# Patient Record
Sex: FEMALE | Race: ASIAN | NOT HISPANIC OR LATINO | Employment: UNEMPLOYED | URBAN - METROPOLITAN AREA
[De-identification: names, ages, dates, MRNs, and addresses within clinical notes are randomized per-mention and may not be internally consistent; named-entity substitution may affect disease eponyms.]

---

## 2017-01-18 ENCOUNTER — ALLIED HEALTH/NURSE VISIT (OUTPATIENT)
Dept: FAMILY MEDICINE | Facility: CLINIC | Age: 47
End: 2017-01-18

## 2017-01-18 DIAGNOSIS — Z23 NEEDS FLU SHOT: Primary | ICD-10-CM

## 2017-01-18 NOTE — NURSING NOTE
Valorie Pacheco      1.  Has the patient received the information for the influenza vaccine? YES    2.  Does the patient have any of the following contraindications?     Allergy to eggs? No     Allergic reaction to previous influenza vaccines? No     Any other problems to previous influenza vaccines? No     Paralyzed by Guillain-Ettrick syndrome? No     Currently pregnant? NO     Current moderate or severe illness? No    Vaccination given by MA.  Recorded by Vitor Bazan     name: Azael Hendricks  Language: [facility]  Agency: [employer]  Phone number: [phone]      Dr Lopez is a preceptor on site during the time of the service.

## 2017-03-01 DIAGNOSIS — R52 BODY ACHES: ICD-10-CM

## 2017-03-02 ENCOUNTER — OFFICE VISIT (OUTPATIENT)
Dept: FAMILY MEDICINE | Facility: CLINIC | Age: 47
End: 2017-03-02

## 2017-03-02 VITALS
WEIGHT: 118.8 LBS | BODY MASS INDEX: 26.73 KG/M2 | DIASTOLIC BLOOD PRESSURE: 66 MMHG | TEMPERATURE: 97.8 F | OXYGEN SATURATION: 97 % | HEART RATE: 61 BPM | HEIGHT: 56 IN | SYSTOLIC BLOOD PRESSURE: 104 MMHG

## 2017-03-02 DIAGNOSIS — Z23 IMMUNIZATION DUE: ICD-10-CM

## 2017-03-02 DIAGNOSIS — R52 BODY ACHES: Primary | ICD-10-CM

## 2017-03-02 NOTE — PROGRESS NOTES
Preceptor Attestation:  Patient's case reviewed and discussed with Renata Packer MD resident. I agree with written assessment and plan of care.  Supervising Physician:  ALAINA TOM MD  [facility]

## 2017-03-02 NOTE — LETTER
[employer] Customer Service  [facility]  [address], [address]  [place] [address]  Fax: [phone]  Phone: [phone]      2017      Valorie Pacheco  [address] [place] [address] [address]  [place] [address]        Dear Valorie,    Thank you for your interest in becoming a [employer] user!    Your access code is: [email or web address]  Expires: 2017  1:22 PM     Please access the [employer] website at [email or web address].  Below the ID and password fields, select the  Sign Up Now  as New User.  You will be prompted to enter the access code listed above as well as additional personal information.  Please follow the directions carefully when creating your username and password.    If you allow your access code to , or if you have any questions please call a [employer] Representative at [phone] during normal clinic hours.     Sincerely,      [employer] Customer Service  [facility]

## 2017-03-02 NOTE — MR AVS SNAPSHOT
"              After Visit Summary   3/2/2017    Valorie Pacheco    MRN: [id number]           Patient Information     Date Of Birth          1970        Visit Information        Provider Department      3/2/2017 1:20 PM Renata Packer MD [facility]        Today's Diagnoses     Immunization due    -  1       Follow-ups after your visit        Who to contact     Please call your clinic at [phone] to:    Ask questions about your health    Make or cancel appointments    Discuss your medicines    Learn about your test results    Speak to your doctor   If you have compliments or concerns about an experience at your clinic, or if you wish to file a complaint, please contact [facility] Patient Relations at [phone] or email us at [email or web address]         Additional Information About Your Visit        MyChart Information     StraighterLine is an electronic gateway that provides easy, online access to your medical records. With StraighterLine, you can request a clinic appointment, read your test results, renew a prescription or communicate with your care team.     To sign up for [employer] visit the website at [email or web address]   You will be asked to enter the access code listed below, as well as some personal information. Please follow the directions to create your username and password.     Your access code is: [email or web address]  Expires: 2017  1:22 PM     Your access code will  in 90 days. If you need help or a new code, please contact your [facility] or call [phone] for assistance.        Care EveryWhere ID     This is your Care EveryWhere ID. This could be used by other organizations to access your [place] medical records  [id number]        Your Vitals Were     Pulse Temperature Height Pulse Oximetry BMI (Body Mass Index)       61 97.8  F (36.6  C) (Oral) 4' 8\" (142.2 cm) 97% 26.63 kg/m2        Blood Pressure from Last 3 " Encounters:   03/02/17 104/66   11/29/16 115/71   02/04/16 106/67    Weight from Last 3 Encounters:   03/02/17 118 lb 12.8 oz (53.9 kg)   11/29/16 113 lb (51.3 kg)   02/04/16 124 lb (56.2 kg)              We Performed the Following     ADMIN VACCINE, INITIAL     TDAP (BOOSTRIX AGES 10 and older)        Primary Care Provider Office Phone # Fax #    Renata Packer [phone]       [facility] [address] [place] [address]        Thank you!     Thank you for choosing [facility]  for your care. Our goal is always to provide you with excellent care. Hearing back from our patients is one way we can continue to improve our services. Please take a few minutes to complete the written survey that you may receive in the mail after your visit with us. Thank you!             Your Updated Medication List - Protect others around you: Learn how to safely use, store and throw away your medicines at www.disposemymeds.org.          This list is accurate as of: 3/2/17  1:48 PM.  Always use your most recent med list.                   Brand Name Dispense Instructions for use    piroxicam 20 MG capsule    FELDENE    30 capsule    Take 1 capsule (20 mg) by mouth daily as needed

## 2017-03-02 NOTE — PROGRESS NOTES
"       HPI:       Valorie Pacheco is a 46 year old  female with a significant past medical history of depression who presents for follow up of concern(s) listed below:    #Med refill:  -Patient here requesting refill of piroxicam- says she takes 1 pill daily, approx 1-2 times per month, which helps with her occasional back pain or wrist pain.  No musculoskeletal complaints today, just wants a refill today  -No hx of kidney disease, no hx of ulcers    BMP 11/2016:  Cr 0.9, GFR 71         PMHX:     Patient Active Problem List   Diagnosis     Body aches     Irregular menses     Major depressive disorder, recurrent episode, moderate (H)       Current Outpatient Prescriptions   Medication Sig Dispense Refill     piroxicam (FELDENE) 20 MG capsule Take 1 capsule (20 mg) by mouth daily as needed 30 capsule 3          Allergies   Allergen Reactions     No Known Allergies        No results found for this or any previous visit (from the past 24 hour(s)).         Review of Systems:   5-Point Review of Systems Negative -- Except as noted above.          Physical Exam:     Vitals:    03/02/17 1313   BP: 104/66   Pulse: 61   Temp: 97.8  F (36.6  C)   TempSrc: Oral   SpO2: 97%   Weight: 118 lb 12.8 oz (53.9 kg)   Height: 4' 8\" (142.2 cm)     Body mass index is 26.63 kg/(m^2).    Gen alert pleasant NAD  Psyc mood and affect appropriate  Neuro alert and oriented x 3, CN grossly normal, ambulating without assistance    Office Visit on 11/29/2016   Component Date Value Ref Range Status     Glucose 11/29/2016 89.0  60.0 - 109.0 mg/dL Final     Urea Nitrogen 11/29/2016 18.0  5.0 - 24.0 mg/dL Final     Creatinine 11/29/2016 0.9  0.6 - 1.3 mg/dL Final     Sodium 11/29/2016 141.0  133.0 - 144.0 mmol/L Final     Potassium 11/29/2016 4.4  3.4 - 5.3 mmol/L Final     Chloride 11/29/2016 105.0  94.0 - 109.0 mmol/L Final     Carbon Dioxide 11/29/2016 27.0  20.0 - 32.0 mmol/L Final     Calcium 11/29/2016 9.2  8.5 - 10.4 mg/dL Final     eGFR Calculated " (Non Black Referen* 11/29/2016 71.3  >60.0 mL/min Final     eGFR Calculated (Black Reference) 11/29/2016 86.3  >60.0 mL/min Final     TSH 11/29/2016 1.55  0.30 - 5.00 uIU/mL Final     Magnesium 11/29/2016 2.3  1.8 - 2.6 mg/dL Final     Hemoglobin 11/29/2016 14.3  11.7 - 15.7 g/dL Final       Assessment and Plan     (R52) Body aches  (primary encounter diagnosis)  Comment: using piroxicam intermittently, normal kidney function in 11/2016.    Plan: Refill sent to pharmacy- counseled patient on limiting use to prn, risk of ulcers and kidney damage with chronic use    (Z23) Immunization due  Comment: Tdap given today  Plan: ADMIN VACCINE, INITIAL, TDAP (BOOSTRIX AGES 10         and older)              Options for treatment and follow-up care were reviewed with the patient and/or guardian. Valorie Junior and/or guardian engaged in the decision making process and verbalized understanding of the options discussed and agreed with the final plan.      Renata Packer MD      Precepted today with: Lacie Brown MD

## 2017-11-29 ENCOUNTER — OFFICE VISIT (OUTPATIENT)
Dept: FAMILY MEDICINE | Facility: CLINIC | Age: 47
End: 2017-11-29

## 2017-11-29 VITALS — OXYGEN SATURATION: 99 % | RESPIRATION RATE: 18 BRPM

## 2017-11-29 DIAGNOSIS — K21.9 GASTROESOPHAGEAL REFLUX DISEASE, ESOPHAGITIS PRESENCE NOT SPECIFIED: ICD-10-CM

## 2017-11-29 DIAGNOSIS — R10.13 ABDOMINAL PAIN, EPIGASTRIC: ICD-10-CM

## 2017-11-29 DIAGNOSIS — E80.6 HYPERBILIRUBINEMIA: ICD-10-CM

## 2017-11-29 DIAGNOSIS — E87.1 HYPONATREMIA: Primary | ICD-10-CM

## 2017-11-29 LAB
% GRANULOCYTES: 77.2 %G (ref 40–75)
ALBUMIN SERPL-MCNC: 4.8 G/DL (ref 3.3–4.6)
ALBUMIN SERPL-MCNC: 4.8 G/DL (ref 3.3–4.6)
ALP SERPL-CCNC: 68 U/L (ref 40–150)
ALP SERPL-CCNC: 68 U/L (ref 40–150)
ALT SERPL-CCNC: 41 U/L (ref 0–45)
ALT SERPL-CCNC: 41 U/L (ref 0–45)
AST SERPL-CCNC: 49 U/L (ref 0–45)
AST SERPL-CCNC: 49 U/L (ref 0–45)
BILIRUB SERPL-MCNC: 2.6 MG/DL (ref 0.2–1.3)
BILIRUB SERPL-MCNC: 2.6 MG/DL (ref 0.2–1.3)
BUN SERPL-MCNC: 2 MG/DL (ref 5–24)
BUN SERPL-MCNC: 2 MG/DL (ref 5–24)
CALCIUM SERPL-MCNC: 8 MG/DL (ref 8.5–10.4)
CALCIUM SERPL-MCNC: 8 MG/DL (ref 8.5–10.4)
CHLORIDE SERPLBLD-SCNC: 82 MMOL/L (ref 94–109)
CHLORIDE SERPLBLD-SCNC: 82 MMOL/L (ref 94–109)
CO2 SERPL-SCNC: 22 MMOL/L (ref 20–32)
CO2 SERPL-SCNC: 22 MMOL/L (ref 20–32)
CREAT SERPL-MCNC: 0.4 MG/DL (ref 0.6–1.3)
CREAT SERPL-MCNC: 0.4 MG/DL (ref 0.6–1.3)
EGFR CALCULATED (BLACK REFERENCE): >90 ML/MIN
EGFR CALCULATED (NON BLACK REFERENCE): >90 ML/MIN
GLUCOSE SERPL-MCNC: 106 MG/DL (ref 60–109)
GLUCOSE SERPL-MCNC: 106 MG/DL (ref 60–109)
GRANULOCYTES #: 3.4 K/UL (ref 1.6–8.3)
HCT VFR BLD AUTO: 39.7 % (ref 35–47)
HEMOGLOBIN: 13.3 G/DL (ref 11.7–15.7)
LIPASE SERPL-CCNC: 12 U/L (ref 0–52)
LYMPHOCYTES # BLD AUTO: 0.8 K/UL (ref 0.8–5.3)
LYMPHOCYTES NFR BLD AUTO: 18.4 %L (ref 20–48)
MCH RBC QN AUTO: 30 PG (ref 26.5–35)
MCHC RBC AUTO-ENTMCNC: 33.5 G/DL (ref 32–36)
MCV RBC AUTO: 89.4 FL (ref 78–100)
MID #: 0.2 K/UL (ref 0–2.2)
MID %: 4.4 %M (ref 0–20)
PLATELET # BLD AUTO: 112 K/UL (ref 150–450)
POTASSIUM SERPL-SCNC: 2.9 MMOL/L (ref 3.4–5.3)
POTASSIUM SERPL-SCNC: 2.9 MMOL/L (ref 3.4–5.3)
PROT SERPL-MCNC: 7.4 G/DL (ref 6.6–8.8)
PROT SERPL-MCNC: 7.4 G/DL (ref 6.6–8.8)
RBC # BLD AUTO: 4.44 M/UL (ref 3.8–5.2)
SODIUM SERPL-SCNC: 121 MMOL/L (ref 133–144)
SODIUM SERPL-SCNC: 121 MMOL/L (ref 133–144)
WBC # BLD AUTO: 4.4 K/UL (ref 4–11)

## 2017-11-29 NOTE — MR AVS SNAPSHOT
After Visit Summary   11/29/2017    Valorie Pacheco    MRN: [id number]           Patient Information     Date Of Birth          1970        Visit Information        Provider Department      11/29/2017 9:20 AM Pipe Ku MD [facility]        Today's Diagnoses     Hyponatremia    -  1    Hyperbilirubinemia        Gastroesophageal reflux disease, esophagitis presence not specified        Abdominal pain, epigastric           Follow-ups after your visit        Who to contact     Please call your clinic at [phone] to:    Ask questions about your health    Make or cancel appointments    Discuss your medicines    Learn about your test results    Speak to your doctor   If you have compliments or concerns about an experience at your clinic, or if you wish to file a complaint, please contact [facility] Patient Relations at [phone] or email us at [email or web address]         Additional Information About Your Visit        MyChart Information     SmartDocs ([employer]eknowmics)t gives you secure access to your electronic health record. If you see a primary care provider, you can also send messages to your care team and make appointments. If you have questions, please call your primary care clinic.  If you do not have a primary care provider, please call [phone] and they will assist you.      [employer] is an electronic gateway that provides easy, online access to your medical records. With In[employer], you can request a clinic appointment, read your test results, renew a prescription or communicate with your care team.     To access your existing account, please contact your [facility] or call [phone] for assistance.        Care EveryWhere ID     This is your Care EveryWhere ID. This could be used by other organizations to access your [place] medical records  [id number]        Your Vitals Were     Respirations Pulse Oximetry                18  99%           Blood Pressure from Last 3 Encounters:   03/02/17 104/66   11/29/16 115/71   02/04/16 106/67    Weight from Last 3 Encounters:   03/02/17 118 lb 12.8 oz (53.9 kg)   11/29/16 113 lb (51.3 kg)   02/04/16 124 lb (56.2 kg)              We Performed the Following     CBC with Diff Plt ([facility])     Comprehensive Metabolic Panel ([facility]) - results <1hr Protocol     Lipase ([facility])          Today's Medication Changes          These changes are accurate as of: 11/29/17 11:59 PM.  If you have any questions, ask your nurse or doctor.               Start taking these medicines.        Dose/Directions    lidocaine (viscous) 15 mL, alum & mag hydroxide-simethicone 15 mL GI Cocktail   Used for:  Gastroesophageal reflux disease, esophagitis presence not specified, Abdominal pain, epigastric   Started by:  Pipe Ku MD        Dose:  30 mL   30 mLs by Oral or Feeding Tube route once for 1 dose   Quantity:  30 mL   Refills:  0            Where to get your medicines      Some of these will need a paper prescription and others can be bought over the counter.  Ask your nurse if you have questions.     Bring a paper prescription for each of these medications     lidocaine (viscous) 15 mL, alum & mag hydroxide-simethicone 15 mL GI Cocktail                Primary Care Provider Office Phone # Fax #    Yvette Nedra Herr [phone]       [facility] [address] [place] [address]        Equal Access to Services     KRISTA HYMAN AH: Hadii aad ku hadasho Socliffali, waaxda luqadaha, qaybta kaalmada adeegyada, waxay karen giron ah. So [facility] [phone].    ATENCIÓN: Si habla español, tiene a jesus disposición servicios gratuitos de asistencia lingüística. Llame al [phone].    We comply with applicable federal civil rights laws and [place] laws. We do not discriminate on the basis of race, color, national origin, age, disability, sex, sexual orientation, or gender  identity.            Thank you!     Thank you for choosing [facility]  for your care. Our goal is always to provide you with excellent care. Hearing back from our patients is one way we can continue to improve our services. Please take a few minutes to complete the written survey that you may receive in the mail after your visit with us. Thank you!             Your Updated Medication List - Protect others around you: Learn how to safely use, store and throw away your medicines at www.disposemymeds.org.          This list is accurate as of: 11/29/17 11:59 PM.  Always use your most recent med list.                   Brand Name Dispense Instructions for use Diagnosis    lidocaine (viscous) 15 mL, alum & mag hydroxide-simethicone 15 mL GI Cocktail     30 mL    30 mLs by Oral or Feeding Tube route once for 1 dose    Gastroesophageal reflux disease, esophagitis presence not specified, Abdominal pain, epigastric       piroxicam 20 MG capsule    FELDENE    30 capsule    Take 1 capsule (20 mg) by mouth daily as needed    Body aches

## 2017-11-29 NOTE — NURSING NOTE
PHQ9  Offer Flu shot    The following medication was given orally:     MEDICATION: Lidocaine Viscous 2%   ROUTE: PO  SITE: mouth  DOSE: 15 ml LOT #: [id number]  :  [employer].  EXPIRATION DATE:  6/1/2018  NDC#: 5017-3309-42     Above was mixed with 15 ml Advanced Antacid by [employer], Lot # [id number], Expiration 6/1/2019.    I administered the above oral medication to Valorie Junior per written orders from Dr. Ku.

## 2017-11-29 NOTE — PROGRESS NOTES
S:  Valorie Pacheco is a 47 year old year old female who  has a past medical history of Body aches (11/21/2015); Irregular menses (2/4/2016); and Major depressive disorder, recurrent episode, moderate (H) (2/4/2016). who presents to discuss:    1. Abdominal pain:  Pain in the upper belly, chest tight, cannot breath, has been seen in ER 3x this week for similar symptoms.  Pain in the abdomen, radiates to the back  Dry mouth, no vomiting  Feels bloated  BM yesterday, normal no blood, normally once q2 days, not hard no straining  Yesterday some burning with urination  Two days of subjective fever at night, did not check with thermometer      Massaging belly which helps the pain  Really severe for about 1 week now  Burning pain in epigastric region, has been taking piroxicam  Never had this before   First told high BP, started on amlodipine, tried drinking lime juice which made her stomach pain worse  Feels weakness    Past Medical History:   Diagnosis Date     Body aches 11/21/2015     Irregular menses 2/4/2016     Major depressive disorder, recurrent episode, moderate (H) 2/4/2016     Past Surgical History:   Procedure Laterality Date     NO HISTORY OF SURGERY       Family History   Problem Relation Age of Onset     Family History Negative Other      DIABETES No family hx of      Breast Cancer No family hx of      Colon Cancer No family hx of      Coronary Artery Disease No family hx of      Hypertension No family hx of      Hyperlipidemia No family hx of      CEREBROVASCULAR DISEASE No family hx of      Prostate Cancer No family hx of      Other Cancer No family hx of      Depression No family hx of      Anxiety Disorder No family hx of      MENTAL ILLNESS No family hx of      Substance Abuse No family hx of      Anesthesia Reaction No family hx of      Asthma No family hx of      OSTEOPOROSIS No family hx of      Genetic Disorder No family hx of      Thyroid Disease No family hx of      Obesity No family hx of      Social  History     Social History     Marital status: Single     Spouse name: N/A     Number of children: N/A     Years of education: N/A     Occupational History     Not on file.     Social History Main Topics     Smoking status: Never Smoker     Smokeless tobacco: Not on file     Alcohol use No     Drug use: No     Sexual activity: Not Currently     Partners: Male     Other Topics Concern     Not on file     Social History Narrative         Vitals:  113/77 86 96.7 rr 18 o2 99  Meds are: Tylenol, amlodipine augmentin x 10 days    After GI cocktail patient reports she feels a little better  Still very weak, but stomach pain is gone    O:  Vitals: Resp 18  SpO2 99%    General: sitting on floor with arms/hands on the chair, able to look up and talk, continuously massaging her stomach with both hands  HEENT: PERRL, moist oral mucus membranes  Pulm: clear to auscultation bilaterally, no tachypnea  CV: RRR, no murmur  Abd: soft, non-tender, non-distended, no masses, no guarding no rebound  Ext: Warm, well perfused, no LE edema  Skin: No rash on limited skin exam  Psych: Mood appropriate to visit content, full affect, rational thought content and process    Labs:  Na 121  K 2.9  Co2 22  BUN 2  Cr 0.4    Ca 8.0  Alb 4.8  T bili 2.6  AST 41  ALT 49    WBC 4.4  hgb 13.1  plt 112      Glucose 106    A/P:    Symptomatic hyponatremia, sodium 121, subacute to chronic with slow decline over the week. Likely hypovolemic due to decreased intake with weeklong abdominal pain. Could be due to excessive water drinking without electrolye intake. Will plan to increase slowly with goal of no more than 130 today  -admit to hospital, discussed with Dr. Colin  -urine electrolytes, UA  -start NS at 50ml/hr, sodium check q6 hours  -replace potassium with 10meQ in 100ml q1 hour x2, recheck BMP q 6 hours for 3 occurrences     Abdominal pain, increasing bilirubin to 2.6, previously indirect hyperbilirubinemia. Exam is benign,  guarding/tenderness/rebound. Actually states it feels better with massage  -add on direct bili  -consider further imaging if exam changes, had a normal CT and US this week        Weakness,  Likely secondary to electrolyte abnormalities  -add on hemoglobin a1c  -normal TSH done this week        DVT:  Low risk, SCDs  Code: full code  FEN: NS at 50ml, clear liquid diet       Pipe Ku MD  [facility] Resident  Pager     Precepted with: Joel Mclean MD and discussed with Tigre Colin MD

## 2017-11-30 ENCOUNTER — HOME CARE/HOSPICE - HEALTHEAST (OUTPATIENT)
Dept: HOME HEALTH SERVICES | Facility: HOME HEALTH | Age: 47
End: 2017-11-30

## 2017-12-04 ENCOUNTER — TELEPHONE (OUTPATIENT)
Dept: FAMILY MEDICINE | Facility: CLINIC | Age: 47
End: 2017-12-04

## 2017-12-06 NOTE — PROGRESS NOTES
Preceptor Attestation:  Patient's case reviewed and discussed with Pipe Ku MD resident and I evaluated the patient. I agree with written assessment and plan of care.  Supervising Physician:  Joel Mclean MD MD  [facility]

## 2018-12-11 ENCOUNTER — TELEPHONE (OUTPATIENT)
Dept: FAMILY MEDICINE | Facility: CLINIC | Age: 48
End: 2018-12-11

## 2019-02-15 ENCOUNTER — HEALTH MAINTENANCE LETTER (OUTPATIENT)
Age: 49
End: 2019-02-15

## 2019-07-03 NOTE — TELEPHONE ENCOUNTER
Attempted to reach patient for hospital follow up from recent discharge for hyponatremia and hypokalemia. Provider requested that patient be scheduled for an appointment today or tomorrow. LM for RC.   Statement Selected

## 2020-03-10 ENCOUNTER — HEALTH MAINTENANCE LETTER (OUTPATIENT)
Age: 50
End: 2020-03-10

## 2020-12-27 ENCOUNTER — HEALTH MAINTENANCE LETTER (OUTPATIENT)
Age: 50
End: 2020-12-27

## 2021-01-15 ENCOUNTER — HEALTH MAINTENANCE LETTER (OUTPATIENT)
Age: 51
End: 2021-01-15

## 2021-04-24 ENCOUNTER — HEALTH MAINTENANCE LETTER (OUTPATIENT)
Age: 51
End: 2021-04-24

## 2021-10-04 ENCOUNTER — HEALTH MAINTENANCE LETTER (OUTPATIENT)
Age: 51
End: 2021-10-04

## 2022-05-15 ENCOUNTER — HEALTH MAINTENANCE LETTER (OUTPATIENT)
Age: 52
End: 2022-05-15

## 2022-09-11 ENCOUNTER — HEALTH MAINTENANCE LETTER (OUTPATIENT)
Age: 52
End: 2022-09-11

## 2023-06-03 ENCOUNTER — HEALTH MAINTENANCE LETTER (OUTPATIENT)
Age: 53
End: 2023-06-03

## 2023-06-09 ENCOUNTER — MEDICAL CORRESPONDENCE (OUTPATIENT)
Dept: HEALTH INFORMATION MANAGEMENT | Facility: CLINIC | Age: 53
End: 2023-06-09
Payer: COMMERCIAL

## 2023-06-12 ENCOUNTER — TRANSFERRED RECORDS (OUTPATIENT)
Dept: HEALTH INFORMATION MANAGEMENT | Facility: CLINIC | Age: 53
End: 2023-06-12
Payer: COMMERCIAL

## 2023-06-12 ENCOUNTER — APPOINTMENT (OUTPATIENT)
Dept: INTERPRETER SERVICES | Facility: CLINIC | Age: 53
End: 2023-06-12
Payer: COMMERCIAL

## 2024-07-13 ENCOUNTER — HEALTH MAINTENANCE LETTER (OUTPATIENT)
Age: 54
End: 2024-07-13

## 2025-05-13 ENCOUNTER — TRANSFERRED RECORDS (OUTPATIENT)
Dept: HEALTH INFORMATION MANAGEMENT | Facility: CLINIC | Age: 55
End: 2025-05-13

## 2025-06-07 ENCOUNTER — HEALTH MAINTENANCE LETTER (OUTPATIENT)
Age: 55
End: 2025-06-07

## 2025-07-19 ENCOUNTER — HEALTH MAINTENANCE LETTER (OUTPATIENT)
Age: 55
End: 2025-07-19